# Patient Record
Sex: MALE | Race: WHITE | NOT HISPANIC OR LATINO | Employment: FULL TIME | URBAN - METROPOLITAN AREA
[De-identification: names, ages, dates, MRNs, and addresses within clinical notes are randomized per-mention and may not be internally consistent; named-entity substitution may affect disease eponyms.]

---

## 2017-01-31 PROBLEM — I20.9 ANGINA PECTORIS: Status: ACTIVE | Noted: 2017-01-31

## 2017-02-06 PROBLEM — G47.30 SLEEP APNEA: Status: ACTIVE | Noted: 2017-02-06

## 2017-02-06 PROBLEM — E78.5 DYSLIPIDEMIA: Status: ACTIVE | Noted: 2017-02-06

## 2018-07-19 ENCOUNTER — TELEPHONE (OUTPATIENT)
Dept: UROLOGY | Facility: CLINIC | Age: 48
End: 2018-07-19

## 2018-07-19 NOTE — TELEPHONE ENCOUNTER
----- Message from Shani Roa sent at 7/19/2018 10:07 AM CDT -----  Type:  Sooner Apoointment Request    Caller is requesting a sooner appointment.  Caller declined first available appointment listed below.  Caller will not accept being placed on the waitlist and is requesting a message be sent to doctor.    Name of Caller:  Lolis Fajardo - spouse  When is the first available appointment?  07/27/18  Symptoms:  Kidney stone  Best Call Back Number:  [phone]  Additional Information:

## 2018-07-23 ENCOUNTER — OFFICE VISIT (OUTPATIENT)
Dept: UROLOGY | Facility: CLINIC | Age: 48
End: 2018-07-23
Payer: COMMERCIAL

## 2018-07-23 ENCOUNTER — HOSPITAL ENCOUNTER (OUTPATIENT)
Dept: RADIOLOGY | Facility: HOSPITAL | Age: 48
Discharge: HOME | End: 2018-07-23
Attending: UROLOGY
Payer: COMMERCIAL

## 2018-07-23 VITALS
SYSTOLIC BLOOD PRESSURE: 143 MMHG | HEART RATE: 70 BPM | DIASTOLIC BLOOD PRESSURE: 72 MMHG | BODY MASS INDEX: 44.1 KG/M2 | WEIGHT: 315 LBS | HEIGHT: 71 IN

## 2018-07-23 DIAGNOSIS — N20.1 LEFT URETERAL STONE: Primary | ICD-10-CM

## 2018-07-23 DIAGNOSIS — R10.9 FLANK PAIN: ICD-10-CM

## 2018-07-23 DIAGNOSIS — N20.1 LEFT URETERAL STONE: ICD-10-CM

## 2018-07-23 DIAGNOSIS — N13.2 HYDRONEPHROSIS WITH OBSTRUCTING CALCULUS: ICD-10-CM

## 2018-07-23 LAB
BILIRUB SERPL-MCNC: NORMAL MG/DL
BLOOD URINE, POC: NORMAL
COLOR, POC UA: YELLOW
GLUCOSE UR QL STRIP: NORMAL
KETONES UR QL STRIP: NORMAL
LEUKOCYTE ESTERASE URINE, POC: NORMAL
NITRITE, POC UA: NORMAL
PH, POC UA: 5.5
PROTEIN, POC: 30
SPECIFIC GRAVITY, POC UA: 1.03
UROBILINOGEN, POC UA: NORMAL

## 2018-07-23 PROCEDURE — 81002 URINALYSIS NONAUTO W/O SCOPE: CPT | Mod: S$GLB,,, | Performed by: UROLOGY

## 2018-07-23 PROCEDURE — 99999 PR PBB SHADOW E&M-EST. PATIENT-LVL III: CPT | Mod: PBBFAC,,, | Performed by: UROLOGY

## 2018-07-23 PROCEDURE — 3077F SYST BP >= 140 MM HG: CPT | Mod: CPTII,S$GLB,, | Performed by: UROLOGY

## 2018-07-23 PROCEDURE — 3008F BODY MASS INDEX DOCD: CPT | Mod: CPTII,S$GLB,, | Performed by: UROLOGY

## 2018-07-23 PROCEDURE — 74018 RADEX ABDOMEN 1 VIEW: CPT | Mod: TC,FY,PO

## 2018-07-23 PROCEDURE — 3078F DIAST BP <80 MM HG: CPT | Mod: CPTII,S$GLB,, | Performed by: UROLOGY

## 2018-07-23 PROCEDURE — 99204 OFFICE O/P NEW MOD 45 MIN: CPT | Mod: 25,S$GLB,, | Performed by: UROLOGY

## 2018-07-23 PROCEDURE — 74018 RADEX ABDOMEN 1 VIEW: CPT | Mod: 26,,, | Performed by: RADIOLOGY

## 2018-07-23 RX ORDER — KETOROLAC TROMETHAMINE 10 MG/1
10 TABLET, FILM COATED ORAL EVERY 6 HOURS
COMMUNITY
End: 2018-07-25

## 2018-07-23 RX ORDER — ONDANSETRON 4 MG/1
8 TABLET, FILM COATED ORAL 2 TIMES DAILY PRN
COMMUNITY
End: 2019-08-16

## 2018-07-23 RX ORDER — HYDROCODONE BITARTRATE AND ACETAMINOPHEN 7.5; 325 MG/1; MG/1
1 TABLET ORAL EVERY 6 HOURS PRN
Qty: 30 TABLET | Refills: 0 | Status: SHIPPED | OUTPATIENT
Start: 2018-07-23 | End: 2019-08-16

## 2018-07-23 RX ORDER — HYDROCODONE BITARTRATE AND ACETAMINOPHEN 7.5; 325 MG/1; MG/1
1 TABLET ORAL EVERY 6 HOURS PRN
COMMUNITY
End: 2018-07-23 | Stop reason: SDUPTHER

## 2018-07-23 RX ORDER — TAMSULOSIN HYDROCHLORIDE 0.4 MG/1
0.4 CAPSULE ORAL NIGHTLY
COMMUNITY
End: 2019-08-16

## 2018-07-23 NOTE — PROGRESS NOTES
Subjective:       Patient ID: Joey Fjaardo is a 48 y.o. male.    Chief Complaint: Nephrolithiasis    HPI     48 year old with left flank pain beginning last week with associated nausea.  He was in [place] at the time and he was seen in the ER.  A CT scan was obtained.  At the time there was a 5 mm stone in the proximal left ureter.  He has no history of stones.  I reviewed the images with him.  No other stones are seen.  He still has pain but it is well controlled with pain medication.  He denies gross hematuria and fever.  KUB today is reviewed and no obvious stones are seen.    Urine dipstick shows negative for all components.    Past Medical History:   Diagnosis Date    Depression     Duodenal ulcer     GERD (gastroesophageal reflux disease) 8/15/2016    HTN (hypertension)     Hypothyroidism     Insulin resistance     Obesity     morbid    Sleep apnea      Past Surgical History:   Procedure Laterality Date    left thumb surgery      VASECTOMY         Current Outpatient Prescriptions:     amLODIPine (NORVASC) 5 MG tablet, TAKE 1 TABLET BY MOUTH EVERY DAY, Disp: 90 tablet, Rfl: 0    aspirin (ECOTRIN) 81 MG EC tablet, Take 1 tablet (81 mg total) by mouth once daily. (Patient taking differently: Take 81 mg by mouth twice a week. ), Disp: , Rfl: 0    buPROPion (WELLBUTRIN SR) 200 MG TbSR, Take 1 tablet by mouth once daily., Disp: , Rfl: 0    losartan (COZAAR) 100 MG tablet, TAKE 1 TABLET(100 MG) BY MOUTH EVERY DAY, Disp: 90 tablet, Rfl: 2    pantoprazole (PROTONIX) 40 MG tablet, TAKE 1 TABLET BY MOUTH EVERY DAY, Disp: 90 tablet, Rfl: 3    SYNTHROID 25 mcg tablet, Take 1 tablet (25 mcg total) by mouth before breakfast., Disp: 90 tablet, Rfl: 1    HYDROcodone-acetaminophen (NORCO) 7.5-325 mg per tablet, Take 1 tablet by mouth every 6 (six) hours as needed for Pain., Disp: 30 tablet, Rfl: 0    ketorolac (TORADOL) 10 mg tablet, Take 10 mg by mouth every 6 (six) hours., Disp: , Rfl:      ondansetron (ZOFRAN) 4 MG tablet, Take 8 mg by mouth 2 (two) times daily., Disp: , Rfl:     tamsulosin (FLOMAX) 0.4 mg Cap, Take 0.4 mg by mouth once daily., Disp: , Rfl:     Review of Systems    Objective:      Physical Exam    Assessment:       1. Left ureteral stone    2. Hydronephrosis with obstructing calculus    3. Flank pain        Plan:       Left ureteral stone  -     POCT URINE DIPSTICK WITHOUT MICROSCOPE  -     X-Ray Abdomen AP 1 View; Future; Expected date: 07/23/2018    Hydronephrosis with obstructing calculus    Flank pain    Other orders  -     HYDROcodone-acetaminophen (NORCO) 7.5-325 mg per tablet; Take 1 tablet by mouth every 6 (six) hours as needed for Pain.  Dispense: 30 tablet; Refill: 0      We discussed treatment options.  Will give a longer trial of passage.  If unable to pass, plan ureteroscopy.     Recommendations:   Increase hydration 2 liters fluid per day.      Strain Urine     Take pain medications as needed     Call office for severe pain or fever >101

## 2018-07-24 ENCOUNTER — TELEPHONE (OUTPATIENT)
Dept: UROLOGY | Facility: CLINIC | Age: 48
End: 2018-07-24

## 2018-07-24 DIAGNOSIS — R10.9 FLANK PAIN: ICD-10-CM

## 2018-07-24 DIAGNOSIS — N20.1 URETERAL STONE: Primary | ICD-10-CM

## 2018-07-24 RX ORDER — LIDOCAINE HYDROCHLORIDE 10 MG/ML
1 INJECTION, SOLUTION EPIDURAL; INFILTRATION; INTRACAUDAL; PERINEURAL ONCE
Status: CANCELLED | OUTPATIENT
Start: 2018-07-24 | End: 2018-07-24

## 2018-07-24 NOTE — TELEPHONE ENCOUNTER
----- Message from Michael Castillo sent at 7/24/2018  8:57 AM CDT -----  Contact: pt wife Lolis Danielle would like to speak to someone regarding the scope that needs to be put in to remove a kidney stone.  Please call pt wife to advise.    Call Back #: [phone]   Thanks

## 2018-07-24 NOTE — TELEPHONE ENCOUNTER
----- Message from Kiet Adams sent at 7/24/2018  9:55 AM CDT -----  Type:  Patient Returning Call    Who Called:  Lolis/Wife  Who Left Message for Patient:  MODE [employer]FIELD  Does the patient know what this is regarding?:  's procedure  Best Call Back Number:  [phone]

## 2018-07-26 ENCOUNTER — TELEPHONE (OUTPATIENT)
Dept: UROLOGY | Facility: CLINIC | Age: 48
End: 2018-07-26

## 2018-07-26 PROBLEM — N20.1 URETERAL STONE: Status: ACTIVE | Noted: 2018-07-26

## 2018-07-27 ENCOUNTER — TELEPHONE (OUTPATIENT)
Dept: UROLOGY | Facility: CLINIC | Age: 48
End: 2018-07-27

## 2018-07-27 NOTE — TELEPHONE ENCOUNTER
----- Message from Emelia Montemayor sent at 7/27/2018 12:21 PM CDT -----  Contact: wife Lolis Fajardo  wife Lolis Fajardo need to speak to nurse regarding rescheduling  patient appointment time to a earlier time original appointment is 7/30 at 11:15     Patient want to come in earlier due to patient has to return to work     Epic earliest time was 3:30p      Please call to advice [phone]

## 2018-07-30 ENCOUNTER — OFFICE VISIT (OUTPATIENT)
Dept: UROLOGY | Facility: CLINIC | Age: 48
End: 2018-07-30
Payer: COMMERCIAL

## 2018-07-30 VITALS
DIASTOLIC BLOOD PRESSURE: 70 MMHG | HEART RATE: 78 BPM | WEIGHT: 315 LBS | HEIGHT: 72 IN | BODY MASS INDEX: 42.66 KG/M2 | SYSTOLIC BLOOD PRESSURE: 122 MMHG

## 2018-07-30 DIAGNOSIS — N20.1 URETERAL STONE: Primary | ICD-10-CM

## 2018-07-30 PROCEDURE — 3074F SYST BP LT 130 MM HG: CPT | Mod: CPTII,S$GLB,, | Performed by: UROLOGY

## 2018-07-30 PROCEDURE — 99213 OFFICE O/P EST LOW 20 MIN: CPT | Mod: S$GLB,,, | Performed by: UROLOGY

## 2018-07-30 PROCEDURE — 3078F DIAST BP <80 MM HG: CPT | Mod: CPTII,S$GLB,, | Performed by: UROLOGY

## 2018-07-30 PROCEDURE — 3008F BODY MASS INDEX DOCD: CPT | Mod: CPTII,S$GLB,, | Performed by: UROLOGY

## 2018-07-30 PROCEDURE — 99999 PR PBB SHADOW E&M-EST. PATIENT-LVL III: CPT | Mod: PBBFAC,,, | Performed by: UROLOGY

## 2018-07-30 NOTE — PROGRESS NOTES
Subjective:       Patient ID: Joey Fajardo is a 48 y.o. male.    Chief Complaint: Nephrolithiasis    HPI     S/p ureteroscopy.  He is doing well.  Here for stent removal.  Stone analysis reviewed.  Calcium oxalate.  He drinks sweet tea throughout day.  He denies fever.      Review of Systems   Constitutional: Negative for fever.   Genitourinary: Negative for dysuria and hematuria.       Objective:      Physical Exam   Constitutional: He is oriented to person, place, and time. He appears well-developed and well-nourished.   Pulmonary/Chest: Effort normal.   Neurological: He is alert and oriented to person, place, and time.   Skin: No rash noted.   Psychiatric: He has a normal mood and affect.   Vitals reviewed.        Stent removed intact.    Assessment:       1. Ureteral stone        Plan:       Ureteral stone      Recommendations:  Increase hydration 2 liters fluid per day.      Low Oxalate diet     Add Citrate (lemon juice daily)

## 2018-08-01 ENCOUNTER — TELEPHONE (OUTPATIENT)
Dept: UROLOGY | Facility: CLINIC | Age: 48
End: 2018-08-01

## 2018-08-13 ENCOUNTER — TELEPHONE (OUTPATIENT)
Dept: UROLOGY | Facility: CLINIC | Age: 48
End: 2018-08-13

## 2018-08-13 NOTE — TELEPHONE ENCOUNTER
Feels pressure and that he is not emptying his bladder well.  Has a constant sense of urgency.  Recommend he restart the Flomax and try AZO until office visit on Friday.

## 2018-08-13 NOTE — TELEPHONE ENCOUNTER
----- Message from Aaliyah Kwan sent at 8/13/2018  9:48 AM CDT -----  Contact: Wife, Lolis Danielle want to speak with a nurse regarding patient still having problems after kidney stones was removed please call back at [phone]

## 2021-05-10 ENCOUNTER — PATIENT MESSAGE (OUTPATIENT)
Dept: RESEARCH | Facility: HOSPITAL | Age: 51
End: 2021-05-10

## 2021-06-28 PROBLEM — S43.003A SUBLUXATION OF TENDON OF LONG HEAD OF BICEPS: Status: ACTIVE | Noted: 2021-06-28

## 2021-06-28 PROBLEM — S46.811A PARTIAL TEAR OF RIGHT SUBSCAPULARIS TENDON: Status: ACTIVE | Noted: 2021-06-28

## 2021-06-28 PROBLEM — M75.121 COMPLETE TEAR OF RIGHT ROTATOR CUFF: Status: ACTIVE | Noted: 2021-06-28

## 2023-03-20 NOTE — TELEPHONE ENCOUNTER
----- Message from Aaliyah Kwan sent at 8/1/2018  9:06 AM CDT -----  Contact: Wife, Lolis Danielle want to speak with a nurse regarding giving office a update on patient please call back at [phone]  
Likely not a UTI but had blood and WBC in urine as expected post-op.  
Patient's wife reports that stent was removed in the office on Monday.  Today patient went to urgent care in [place] and was diagnosed with a UTI and put on Bactrim.  Patient had completed the Cipro that was prescribed, but, had pain and UTI symptoms, a urine culture was done.  
Wife informed of Dr. Dias's response.  
solids

## 2023-09-08 PROBLEM — E66.9 CLASS 2 OBESITY WITH BODY MASS INDEX (BMI) OF 39.0 TO 39.9 IN ADULT: Status: ACTIVE | Noted: 2023-09-08
